# Patient Record
Sex: MALE | Race: WHITE
[De-identification: names, ages, dates, MRNs, and addresses within clinical notes are randomized per-mention and may not be internally consistent; named-entity substitution may affect disease eponyms.]

---

## 2018-02-07 ENCOUNTER — HOSPITAL ENCOUNTER (EMERGENCY)
Dept: HOSPITAL 31 - C.ER | Age: 53
Discharge: HOME | End: 2018-02-07
Payer: MEDICAID

## 2018-02-07 VITALS
DIASTOLIC BLOOD PRESSURE: 77 MMHG | OXYGEN SATURATION: 100 % | SYSTOLIC BLOOD PRESSURE: 118 MMHG | HEART RATE: 71 BPM | TEMPERATURE: 98.8 F

## 2018-02-07 VITALS — RESPIRATION RATE: 18 BRPM

## 2018-02-07 DIAGNOSIS — J06.9: Primary | ICD-10-CM

## 2018-02-07 DIAGNOSIS — F17.210: ICD-10-CM

## 2018-02-07 NOTE — C.PDOC
History Of Present Illness


53 y/o M c no PMHx p/w nonproductive cough x 2 days. Denies fever, chills, 

nausea, vomiting, chest pain, dyspnea, abdominal pain, diarrhea, constipation. 

Took Aleve with minimal relief.


Time Seen by Provider: 02/07/18 07:48


Chief Complaint (Nursing): Cough, Cold, Congestion





Past Medical History


Vital Signs: 





 Last Vital Signs











Temp  98.8 F   02/07/18 07:18


 


Pulse  71   02/07/18 07:18


 


Resp  16   02/07/18 07:18


 


BP  118/77   02/07/18 07:18


 


Pulse Ox  100   02/07/18 07:18











Family History: States: Unknown Family Hx





- Social History


Hx Tobacco Use: Yes


Hx Alcohol Use: Yes


Hx Substance Use: No





- Immunization History


Hx Tetanus Toxoid Vaccination: No


Hx Influenza Vaccination: No


Hx Pneumococcal Vaccination: No





Review Of Systems


Except As Marked, All Systems Reviewed And Found Negative.


Constitutional: Negative for: Fever


Cardiovascular: Negative for: Chest Pain





Physical Exam





- Physical Exam


Additional Physical Exam Comments: 


Gen: NAD


Head: NC/AT


Eyes: PERRL


ENT: No swelling or exudates.


Neck: No nuchal rigidity


Chest: No tenderness


CV: Regular rate


Lungs: CTA b/l


Abd: Soft, NT


Back: No CVA tenderness


Ext: No swelling


Neuro: Alert, no focal deficit





ED Course And Treatment


O2 Sat by Pulse Oximetry: 100





Medical Decision Making


Medical Decision Making: 


Impression: 53 y/o M p/w sore throat, cough with no vital sign abnormalities 

and clear lungs on auscultation. 0 Centor critiera. Consistent with viral URI.





Plan: OTC analgesia. Robitussin. Lozenges. Mucinex. F/u primary care, 

instructed to return to ED for worsening pain, fever, dyspnea, vomiting.





Disposition





- Disposition


Disposition: HOME/ ROUTINE


Disposition Time: 07:58


Condition: STABLE


Prescriptions: 


Benzonatate [Tessalon Perles] 200 mg PO TID #30 sgl


Guaifenesin [Mucus-ER Max] 1,200 mg PO BID #18 tab.er.12h


Ibuprofen [Motrin] 600 mg PO Q6 #25 tab


Instructions:  Upper Respiratory Infection (ED)





- Clinical Impression


Clinical Impression: 


 Upper respiratory infection